# Patient Record
Sex: FEMALE | Race: WHITE | HISPANIC OR LATINO | URBAN - METROPOLITAN AREA
[De-identification: names, ages, dates, MRNs, and addresses within clinical notes are randomized per-mention and may not be internally consistent; named-entity substitution may affect disease eponyms.]

---

## 2024-06-12 ENCOUNTER — APPOINTMENT (OUTPATIENT)
Dept: OBSTETRICS AND GYNECOLOGY | Facility: CLINIC | Age: 39
End: 2024-06-12
Payer: COMMERCIAL

## 2024-06-12 VITALS
BODY MASS INDEX: 32.65 KG/M2 | WEIGHT: 208 LBS | HEIGHT: 67 IN | DIASTOLIC BLOOD PRESSURE: 88 MMHG | SYSTOLIC BLOOD PRESSURE: 116 MMHG

## 2024-06-12 DIAGNOSIS — Z00.00 HEALTHCARE MAINTENANCE: ICD-10-CM

## 2024-06-12 DIAGNOSIS — Z75.8 DOES NOT HAVE PRIMARY CARE PROVIDER: ICD-10-CM

## 2024-06-12 DIAGNOSIS — Z01.419 WELL WOMAN EXAM WITH ROUTINE GYNECOLOGICAL EXAM: Primary | ICD-10-CM

## 2024-06-12 DIAGNOSIS — Z30.41 ENCOUNTER FOR SURVEILLANCE OF CONTRACEPTIVE PILLS: ICD-10-CM

## 2024-06-12 PROCEDURE — 99396 PREV VISIT EST AGE 40-64: CPT

## 2024-06-12 PROCEDURE — 1036F TOBACCO NON-USER: CPT

## 2024-06-12 RX ORDER — ASCORBIC ACID 500 MG
TABLET,CHEWABLE ORAL
COMMUNITY
Start: 2018-03-08

## 2024-06-12 RX ORDER — FEXOFENADINE HCL 60 MG
60 TABLET ORAL DAILY
COMMUNITY

## 2024-06-12 RX ORDER — DROSPIRENONE AND ETHINYL ESTRADIOL 0.03MG-3MG
KIT ORAL
COMMUNITY
End: 2024-06-12 | Stop reason: SDUPTHER

## 2024-06-12 RX ORDER — GLUCOSAMINE/CHONDRO SU A 500-400 MG
1 TABLET ORAL 3 TIMES DAILY
COMMUNITY

## 2024-06-12 RX ORDER — DROSPIRENONE AND ETHINYL ESTRADIOL 0.03MG-3MG
KIT ORAL
Qty: 28 TABLET | Refills: 12 | Status: SHIPPED | OUTPATIENT
Start: 2024-06-12

## 2024-06-12 NOTE — PROGRESS NOTES
"Subjective   Geni Villalobos is a 39 y.o. female who is here for Annual Exam (Last PAP= 6/5/2023 negative //Last MAMMO= due today //Sharon PATRICIA MA, II/).     Concerns today:  None    Periods are rare, takes her birthcontrol continuously. Stops quarterly to have a bleed. Patient has no concerns on the OCP. She reports that she has been on it for 20 years. She would like to continue on it at this time. No contraindications.   Sexual Activity: sexually active, male partners; Patient reports 1 partners in the last 12 months.  Pain with intercourse? No   Loss of desire? No   Able to have an orgasm? Yes     History of prior STI: none  Desires STI screening? No    Current contraception: OCP (estrogen/progesterone)- has been on Claudia   Last pap: 6/5/23  History of abnormal Pap smear: yes - 12 years ago. H/O LEEP. No abnormal since leep procedure.   Family history of uterine or ovarian cancer: no    Last mammogram: NA  History of abnormal mammogram: no  Family history of breast cancer: no    OB History   No obstetric history on file.      Objective   /88   Ht 1.702 m (5' 7\")   Wt 94.3 kg (208 lb)        General:   Alert and oriented x 3   Heart:  Thyroid: Regular rate, rhythm  Euthyroid, normal shape and size   Lungs:  Breast: Clear to auscultation bilaterally  Symmetrical, no skin changes/nipple discharge, redness, tenderness, no masses palpated bilaterally   Abdomen: Soft, non tender   Vulva: EGBUS normal   Vagina: Pink, normal discharge   Cervix: No CMT   Uterus: Normal shape, size   Adnexa: NT bilaterally       Assessment/Plan   Diagnoses and all orders for this visit:  Well woman exam with routine gynecological exam  Healthcare maintenance  -     BI mammo bilateral screening tomosynthesis; Future  -     Hemoglobin A1C; Future  -     Lipid Panel; Future  -     Comprehensive Metabolic Panel; Future  -     CBC; Future  Does not have primary care provider  -     Referral to Primary Care; Future  Encounter for surveillance " of contraceptive pills  -     drospirenone-ethinyl estradioL (Verónica, Ocella) 3-0.03 mg tablet; TAKE 1 TABLET BY MOUTH EVERY DAY AS DIRECTED SKIP PLACEBO PILLS AND TAKE ONLY ACTIVE PILLS    Discussed taking the Claudia, patient has been on it for a very prolonged period of time. Discussed warning signs, patient without contraindications at this time. Does not desire childbearing and is happy on the contraceptive.   Discussed primary care. Patient reports that she has not seen primary care for 6 years. Healthcare maintenance labs ordered, order for primary care placed. Encouraged follow up, patient verbalizes understanding.   Encouraged to reach out to our office with any questions or concerns.   Encouraged patient to follow up annually or PRN    JORGE Davis

## 2024-06-21 ENCOUNTER — HOSPITAL ENCOUNTER (OUTPATIENT)
Dept: RADIOLOGY | Facility: CLINIC | Age: 39
Discharge: HOME | End: 2024-06-21
Payer: COMMERCIAL

## 2024-06-21 VITALS — WEIGHT: 202 LBS | HEIGHT: 67 IN | BODY MASS INDEX: 31.71 KG/M2

## 2024-06-21 DIAGNOSIS — Z00.00 HEALTHCARE MAINTENANCE: ICD-10-CM

## 2024-06-21 PROCEDURE — 77067 SCR MAMMO BI INCL CAD: CPT

## 2025-04-15 DIAGNOSIS — Z30.41 ENCOUNTER FOR SURVEILLANCE OF CONTRACEPTIVE PILLS: ICD-10-CM

## 2025-04-15 RX ORDER — DROSPIRENONE AND ETHINYL ESTRADIOL 0.03MG-3MG
KIT ORAL
Qty: 112 TABLET | Refills: 3 | Status: SHIPPED | OUTPATIENT
Start: 2025-04-15

## 2025-06-11 ENCOUNTER — APPOINTMENT (OUTPATIENT)
Dept: OBSTETRICS AND GYNECOLOGY | Facility: CLINIC | Age: 40
End: 2025-06-11
Payer: COMMERCIAL

## 2025-06-13 ENCOUNTER — APPOINTMENT (OUTPATIENT)
Dept: OBSTETRICS AND GYNECOLOGY | Facility: CLINIC | Age: 40
End: 2025-06-13
Payer: COMMERCIAL

## 2025-07-03 ENCOUNTER — OFFICE VISIT (OUTPATIENT)
Dept: URGENT CARE | Age: 40
End: 2025-07-03
Payer: COMMERCIAL

## 2025-07-03 VITALS
RESPIRATION RATE: 20 BRPM | BODY MASS INDEX: 30.29 KG/M2 | WEIGHT: 193 LBS | SYSTOLIC BLOOD PRESSURE: 128 MMHG | HEIGHT: 67 IN | TEMPERATURE: 98.8 F | OXYGEN SATURATION: 99 % | HEART RATE: 84 BPM | DIASTOLIC BLOOD PRESSURE: 77 MMHG

## 2025-07-03 DIAGNOSIS — J01.90 ACUTE SINUSITIS, RECURRENCE NOT SPECIFIED, UNSPECIFIED LOCATION: ICD-10-CM

## 2025-07-03 DIAGNOSIS — R05.1 ACUTE COUGH: Primary | ICD-10-CM

## 2025-07-03 DIAGNOSIS — J02.9 SORE THROAT: ICD-10-CM

## 2025-07-03 DIAGNOSIS — J06.9 UPPER RESPIRATORY TRACT INFECTION, UNSPECIFIED TYPE: ICD-10-CM

## 2025-07-03 LAB
POC HUMAN RHINOVIRUS PCR: NEGATIVE
POC INFLUENZA A VIRUS PCR: NEGATIVE
POC INFLUENZA B VIRUS PCR: NEGATIVE
POC RESPIRATORY SYNCYTIAL VIRUS PCR: NEGATIVE
POC STREPTOCOCCUS PYOGENES (GROUP A STREP) PCR: NEGATIVE

## 2025-07-03 RX ORDER — BENZONATATE 200 MG/1
200 CAPSULE ORAL 3 TIMES DAILY PRN
Qty: 30 CAPSULE | Refills: 0 | Status: SHIPPED | OUTPATIENT
Start: 2025-07-03

## 2025-07-03 RX ORDER — AMOXICILLIN AND CLAVULANATE POTASSIUM 875; 125 MG/1; MG/1
875 TABLET, FILM COATED ORAL 2 TIMES DAILY
Qty: 20 TABLET | Refills: 0 | Status: SHIPPED | OUTPATIENT
Start: 2025-07-03 | End: 2025-07-13

## 2025-07-03 ASSESSMENT — ENCOUNTER SYMPTOMS
COUGH: 1
SORE THROAT: 1
LOSS OF SENSATION IN FEET: 0
CHILLS: 0
RHINORRHEA: 1
FEVER: 0
DEPRESSION: 0
DIAPHORESIS: 0
OCCASIONAL FEELINGS OF UNSTEADINESS: 0
SHORTNESS OF BREATH: 0

## 2025-07-03 NOTE — PATIENT INSTRUCTIONS
alternate ibuprofen, 800mg max (with food), and tylenol, 1000mg max, every 4 hours as needed for discomfort/fever    Continue your antihistamine, allegra    use saline nasal spray, for nasal congestion/runny nose.    take decongestant at night ex, hardik seltzer cold pills, robitussin, delsym, etc, for nasal congestion/runny nose.    gargle with 1 tsp salt in 8 oz of warm water or listerine original, for sore throat.    use cepacol throat lozenges/chloroseptic spray, for sore throat    follow up with primary care if no improvement in 3 - 4 days.    take benzonatate for cough.    finish all antibiotics, augmentin.

## 2025-07-03 NOTE — PROGRESS NOTES
"Subjective   Patient ID: Geni Villalobos is a 40 y.o. female. They present today with a chief complaint of Sore Throat (Had a cold x 2 weeks ago. ).    History of Present Illness  Sore throat, worse am, started one week ago.    Also, nasal congestion/rhinorrhea, productive cough, improving.    Denies fever, chills, sweats, chest pain, SOB, ear pain.    Hx of allergies, take allegra daily.       Sore Throat   Associated symptoms include congestion and coughing. Pertinent negatives include no ear pain or shortness of breath.       Past Medical History  Allergies as of 07/03/2025 - Reviewed 07/03/2025   Allergen Reaction Noted    Beeswax Unknown 01/13/2025    Propylene glycol Unknown 01/13/2025       Prescriptions Prior to Admission[1]     Medical History[2]    Surgical History[3]     reports that she has never smoked. She has never used smokeless tobacco.    Review of Systems  Review of Systems   Constitutional:  Negative for chills, diaphoresis and fever.   HENT:  Positive for congestion, rhinorrhea and sore throat. Negative for ear pain.    Respiratory:  Positive for cough. Negative for shortness of breath.    Cardiovascular:  Negative for chest pain.   All other systems reviewed and are negative.                                 Objective    Vitals:    07/03/25 1248   BP: 128/77   Pulse: 84   Resp: 20   Temp: 37.1 °C (98.8 °F)   TempSrc: Oral   SpO2: 99%   Weight: 87.5 kg (193 lb)   Height: 1.702 m (5' 7\")     No LMP recorded (lmp unknown).    Physical Exam  Vitals and nursing note reviewed.   Constitutional:       General: She is not in acute distress.  HENT:      Right Ear: Tympanic membrane normal.      Left Ear: Tympanic membrane normal.      Nose: Congestion and rhinorrhea present.      Mouth/Throat:      Pharynx: Posterior oropharyngeal erythema present. No oropharyngeal exudate.      Comments: Mild tonsillar erythema, no exudates or swelling.   Cardiovascular:      Rate and Rhythm: Normal rate and regular " rhythm.      Heart sounds: Normal heart sounds.   Pulmonary:      Effort: Pulmonary effort is normal.      Breath sounds: Normal breath sounds.   Neurological:      Mental Status: She is alert.         Procedures    Point of Care Test & Imaging Results from this visit  Results for orders placed or performed in visit on 07/03/25   POCT SPOTFIRE R/ST Panel Mini w/Strep A ([employer]) manually resulted   Result Value Ref Range    POC Group A Strep, PCR Negative Negative    POC Respiratory Syncytial Virus PCR Negative Negative    POC Influenza A Virus PCR Negative Negative    POC Influenza B Virus PCR Negative Negative    POC Human Rhinovirus PCR Negative Negative      Imaging  No results found.    Cardiology, Vascular, and Other Imaging  No other imaging results found for the past 2 days      Diagnostic study results (if any) were reviewed by Lizet Banks PA-C.    Assessment/Plan   Allergies, medications, history, and pertinent labs/EKGs/Imaging reviewed by Lizet Banks PA-C.     Orders and Diagnoses  Diagnoses and all orders for this visit:  Acute cough  -     benzonatate (Tessalon) 200 mg capsule; Take 1 capsule (200 mg) by mouth 3 times a day as needed for cough for up to 30 doses. Do not crush or chew.  Upper respiratory tract infection, unspecified type  Sore throat  -     POCT SPOTFIRE R/ST Panel Mini w/Strep A ([employer]) manually resulted  Acute sinusitis, recurrence not specified, unspecified location  -     amoxicillin-clavulanate (Augmentin) 875-125 mg tablet; Take 1 tablet by mouth 2 times a day for 10 days.      Medical Admin Record      Patient disposition: Home    Electronically signed by Lizet Banks PA-C  1:57 PM           [1] (Not in a hospital admission)  [2]   Past Medical History:  Diagnosis Date    Other specified postprocedural states     H/O LEEP    Personal history of other diseases of the circulatory system     History of varicose veins of lower extremity   [3]   Past Surgical  History:  Procedure Laterality Date    CERVICAL BIOPSY  W/ LOOP ELECTRODE EXCISION  10/08/2015    Cervical Loop Electrosurgical Excision (LEEP)    VARICOSE VEIN SURGERY  10/08/2015    Varicose Vein Ligation

## 2025-10-03 ENCOUNTER — APPOINTMENT (OUTPATIENT)
Dept: OBSTETRICS AND GYNECOLOGY | Facility: CLINIC | Age: 40
End: 2025-10-03
Payer: COMMERCIAL